# Patient Record
Sex: FEMALE | Race: WHITE | NOT HISPANIC OR LATINO | URBAN - METROPOLITAN AREA
[De-identification: names, ages, dates, MRNs, and addresses within clinical notes are randomized per-mention and may not be internally consistent; named-entity substitution may affect disease eponyms.]

---

## 2017-02-03 ENCOUNTER — OUTPATIENT (OUTPATIENT)
Dept: OUTPATIENT SERVICES | Facility: HOSPITAL | Age: 62
LOS: 1 days | End: 2017-02-03
Payer: COMMERCIAL

## 2017-02-03 ENCOUNTER — TRANSCRIPTION ENCOUNTER (OUTPATIENT)
Age: 62
End: 2017-02-03

## 2017-02-03 VITALS
DIASTOLIC BLOOD PRESSURE: 75 MMHG | HEART RATE: 57 BPM | WEIGHT: 222.67 LBS | OXYGEN SATURATION: 98 % | TEMPERATURE: 98 F | RESPIRATION RATE: 11 BRPM | SYSTOLIC BLOOD PRESSURE: 149 MMHG | HEIGHT: 63 IN

## 2017-02-03 VITALS
SYSTOLIC BLOOD PRESSURE: 112 MMHG | HEART RATE: 74 BPM | OXYGEN SATURATION: 94 % | DIASTOLIC BLOOD PRESSURE: 72 MMHG | RESPIRATION RATE: 17 BRPM

## 2017-02-03 DIAGNOSIS — H33.012 RETINAL DETACHMENT WITH SINGLE BREAK, LEFT EYE: ICD-10-CM

## 2017-02-03 PROCEDURE — 85610 PROTHROMBIN TIME: CPT

## 2017-02-03 PROCEDURE — 85730 THROMBOPLASTIN TIME PARTIAL: CPT

## 2017-02-03 PROCEDURE — C1889: CPT

## 2017-02-03 PROCEDURE — C1814: CPT

## 2017-02-03 PROCEDURE — 93010 ELECTROCARDIOGRAM REPORT: CPT

## 2017-02-03 PROCEDURE — 93005 ELECTROCARDIOGRAM TRACING: CPT

## 2017-02-03 PROCEDURE — 67108 REPAIR DETACHED RETINA: CPT | Mod: LT

## 2017-02-03 NOTE — ASU PATIENT PROFILE, ADULT - PMH
Afib    Diabetes  Pre diabetic"diet Controlled"  HTN (hypertension)    Umbilical hernia, incarcerated

## 2017-02-03 NOTE — ASU DISCHARGE PLAN (ADULT/PEDIATRIC). - PT EDUC
Gas bubble instructions reviewed with good understanding ,gas bubble bracelet on pt.  Eye shield with instructions , sunglasses and eye kit given to patient./other (specify)

## 2017-02-03 NOTE — ASU DISCHARGE PLAN (ADULT/PEDIATRIC). - NOTIFY
Inability to Tolerate Liquids or Foods/Persistent Nausea and Vomiting/Pain not relieved by Medications/Unable to Urinate/Swelling that continues/Fever greater than 101/Bleeding that does not stop

## 2017-06-20 ENCOUNTER — RESULT REVIEW (OUTPATIENT)
Age: 62
End: 2017-06-20

## 2020-08-26 PROBLEM — I10 ESSENTIAL (PRIMARY) HYPERTENSION: Chronic | Status: ACTIVE | Noted: 2017-02-03

## 2020-09-03 ENCOUNTER — OUTPATIENT (OUTPATIENT)
Dept: OUTPATIENT SERVICES | Facility: HOSPITAL | Age: 65
LOS: 1 days | End: 2020-09-03
Payer: COMMERCIAL

## 2020-09-03 VITALS
WEIGHT: 209.44 LBS | TEMPERATURE: 98 F | SYSTOLIC BLOOD PRESSURE: 136 MMHG | HEIGHT: 62 IN | OXYGEN SATURATION: 98 % | DIASTOLIC BLOOD PRESSURE: 80 MMHG | HEART RATE: 74 BPM | RESPIRATION RATE: 16 BRPM

## 2020-09-03 DIAGNOSIS — Z41.9 ENCOUNTER FOR PROCEDURE FOR PURPOSES OTHER THAN REMEDYING HEALTH STATE, UNSPECIFIED: Chronic | ICD-10-CM

## 2020-09-03 DIAGNOSIS — N95.0 POSTMENOPAUSAL BLEEDING: ICD-10-CM

## 2020-09-03 DIAGNOSIS — Z98.890 OTHER SPECIFIED POSTPROCEDURAL STATES: Chronic | ICD-10-CM

## 2020-09-03 DIAGNOSIS — Z01.818 ENCOUNTER FOR OTHER PREPROCEDURAL EXAMINATION: ICD-10-CM

## 2020-09-03 LAB
A1C WITH ESTIMATED AVERAGE GLUCOSE RESULT: 14.5 % — HIGH (ref 4–5.6)
ANION GAP SERPL CALC-SCNC: 9 MMOL/L — SIGNIFICANT CHANGE UP (ref 5–17)
BASOPHILS # BLD AUTO: 0.05 K/UL — SIGNIFICANT CHANGE UP (ref 0–0.2)
BASOPHILS NFR BLD AUTO: 0.6 % — SIGNIFICANT CHANGE UP (ref 0–2)
BUN SERPL-MCNC: 25 MG/DL — HIGH (ref 7–23)
CALCIUM SERPL-MCNC: 9.3 MG/DL — SIGNIFICANT CHANGE UP (ref 8.5–10.1)
CHLORIDE SERPL-SCNC: 104 MMOL/L — SIGNIFICANT CHANGE UP (ref 96–108)
CO2 SERPL-SCNC: 22 MMOL/L — SIGNIFICANT CHANGE UP (ref 22–31)
CREAT SERPL-MCNC: 1.27 MG/DL — SIGNIFICANT CHANGE UP (ref 0.5–1.3)
EOSINOPHIL # BLD AUTO: 0.18 K/UL — SIGNIFICANT CHANGE UP (ref 0–0.5)
EOSINOPHIL NFR BLD AUTO: 2 % — SIGNIFICANT CHANGE UP (ref 0–6)
ESTIMATED AVERAGE GLUCOSE: 369 MG/DL — HIGH (ref 68–114)
GLUCOSE SERPL-MCNC: 359 MG/DL — HIGH (ref 70–99)
HCT VFR BLD CALC: 38.9 % — SIGNIFICANT CHANGE UP (ref 34.5–45)
HGB BLD-MCNC: 11.8 G/DL — SIGNIFICANT CHANGE UP (ref 11.5–15.5)
IMM GRANULOCYTES NFR BLD AUTO: 0.7 % — SIGNIFICANT CHANGE UP (ref 0–1.5)
LYMPHOCYTES # BLD AUTO: 1.53 K/UL — SIGNIFICANT CHANGE UP (ref 1–3.3)
LYMPHOCYTES # BLD AUTO: 17 % — SIGNIFICANT CHANGE UP (ref 13–44)
MCHC RBC-ENTMCNC: 24.7 PG — LOW (ref 27–34)
MCHC RBC-ENTMCNC: 30.3 GM/DL — LOW (ref 32–36)
MCV RBC AUTO: 81.4 FL — SIGNIFICANT CHANGE UP (ref 80–100)
MONOCYTES # BLD AUTO: 0.66 K/UL — SIGNIFICANT CHANGE UP (ref 0–0.9)
MONOCYTES NFR BLD AUTO: 7.3 % — SIGNIFICANT CHANGE UP (ref 2–14)
NEUTROPHILS # BLD AUTO: 6.51 K/UL — SIGNIFICANT CHANGE UP (ref 1.8–7.4)
NEUTROPHILS NFR BLD AUTO: 72.4 % — SIGNIFICANT CHANGE UP (ref 43–77)
PLATELET # BLD AUTO: 292 K/UL — SIGNIFICANT CHANGE UP (ref 150–400)
POTASSIUM SERPL-MCNC: 4.6 MMOL/L — SIGNIFICANT CHANGE UP (ref 3.5–5.3)
POTASSIUM SERPL-SCNC: 4.6 MMOL/L — SIGNIFICANT CHANGE UP (ref 3.5–5.3)
RBC # BLD: 4.78 M/UL — SIGNIFICANT CHANGE UP (ref 3.8–5.2)
RBC # FLD: 19 % — HIGH (ref 10.3–14.5)
SODIUM SERPL-SCNC: 135 MMOL/L — SIGNIFICANT CHANGE UP (ref 135–145)
WBC # BLD: 8.99 K/UL — SIGNIFICANT CHANGE UP (ref 3.8–10.5)
WBC # FLD AUTO: 8.99 K/UL — SIGNIFICANT CHANGE UP (ref 3.8–10.5)

## 2020-09-03 PROCEDURE — 93010 ELECTROCARDIOGRAM REPORT: CPT

## 2020-09-03 PROCEDURE — 80048 BASIC METABOLIC PNL TOTAL CA: CPT

## 2020-09-03 PROCEDURE — 85025 COMPLETE CBC W/AUTO DIFF WBC: CPT

## 2020-09-03 PROCEDURE — 86900 BLOOD TYPING SEROLOGIC ABO: CPT

## 2020-09-03 PROCEDURE — G0463: CPT | Mod: 25

## 2020-09-03 PROCEDURE — 83036 HEMOGLOBIN GLYCOSYLATED A1C: CPT

## 2020-09-03 PROCEDURE — 36415 COLL VENOUS BLD VENIPUNCTURE: CPT

## 2020-09-03 PROCEDURE — 93005 ELECTROCARDIOGRAM TRACING: CPT

## 2020-09-03 PROCEDURE — 86850 RBC ANTIBODY SCREEN: CPT

## 2020-09-03 PROCEDURE — 86901 BLOOD TYPING SEROLOGIC RH(D): CPT

## 2020-09-03 NOTE — H&P PST ADULT - NSICDXFAMILYHX_GEN_ALL_CORE_FT
FAMILY HISTORY:  Family history of early CAD, father who passed away at age 41  Family history of malignant melanoma, mother who passed away at age 54  FH: myocardial infarction, father who passed away at age 41

## 2020-09-03 NOTE — H&P PST ADULT - HISTORY OF PRESENT ILLNESS
This is a 64 y/o female with a PMH of afib S/P ablation X 2 (now on coumadin), HTN, prediabetic (awaiting new Hgb A1C), who reports she has been having post menopausal bleeding that was occurring intermittently since she was aged 57. She reports that in March 2020 the bleeding increased and was diagnosed with fibroids. She is now scheduled for a laparoscopic total hysterectomy and B/L salpingo-oophorectomy and removal of vulva skin tags. She denies any fevers, abdominal pain, N/V/D, or back pain. Denies SOB, chest pain or palpations.

## 2020-09-03 NOTE — H&P PST ADULT - NSICDXPASTSURGICALHX_GEN_ALL_CORE_FT
PAST SURGICAL HISTORY:  Elective surgery Left detached retina 2017    History of umbilical hernia repair 2006

## 2020-09-03 NOTE — H&P PST ADULT - ASSESSMENT
This is a 66 y/o female with post menopausal bleeding and fibroids who is scheduled for a laparoscopic total hysterectomy and B/L salpingo oophorectomy and removal of vulva skin tags    Patient instructed on     1. NPO post midnight of surgery  2. On the use of EZ sponges  3. Aware that she needs medical and cardiac clearance (will ask when to hold Coumadin)   4. Aware that she needs COVID 19 testing on September 7, @ 11:30 AM  5. May take Metoprolol and Multaq with a sip of water on morning of procedure

## 2020-09-03 NOTE — H&P PST ADULT - NSICDXPASTMEDICALHX_GEN_ALL_CORE_FT
PAST MEDICAL HISTORY:  Afib on coumadin    Diabetes Pre diabetic . Awaiting results of HgbA1C as of 9/3/2020    Fibroid, uterine     HTN (hypertension)     Menorrhagia     Post-menopause bleeding     S/P ablation of atrial flutter     UTI (urinary tract infection), bacterial pt on antibiotics as of 9/3/2020

## 2020-09-04 DIAGNOSIS — Z01.818 ENCOUNTER FOR OTHER PREPROCEDURAL EXAMINATION: ICD-10-CM

## 2020-09-04 DIAGNOSIS — N95.0 POSTMENOPAUSAL BLEEDING: ICD-10-CM

## 2020-09-04 NOTE — CHART NOTE - NSCHARTNOTEFT_GEN_A_CORE
Patient seen in PST 9/3/2020. Blood work done. HGB AIC of 14.5% with a glucose of 359 mg/dL. Patient state she was prediabetic. Call made to Dr. Kirk office. Office closed 9/4/2020. Call made to PCP, Dr. Pink. Patient presently in office for medical evaluation. Faxed copy of labwork received by office. Patient to be evaluated.

## 2020-09-25 PROBLEM — I48.91 UNSPECIFIED ATRIAL FIBRILLATION: Chronic | Status: ACTIVE | Noted: 2017-02-03

## 2020-09-25 PROBLEM — N95.0 POSTMENOPAUSAL BLEEDING: Chronic | Status: ACTIVE | Noted: 2020-09-03

## 2020-09-25 PROBLEM — D25.9 LEIOMYOMA OF UTERUS, UNSPECIFIED: Chronic | Status: ACTIVE | Noted: 2020-09-03

## 2020-09-25 PROBLEM — N92.0 EXCESSIVE AND FREQUENT MENSTRUATION WITH REGULAR CYCLE: Chronic | Status: ACTIVE | Noted: 2020-09-03

## 2020-10-15 ENCOUNTER — OUTPATIENT (OUTPATIENT)
Dept: OUTPATIENT SERVICES | Facility: HOSPITAL | Age: 65
LOS: 1 days | End: 2020-10-15
Payer: COMMERCIAL

## 2020-10-15 VITALS
TEMPERATURE: 98 F | HEIGHT: 62.5 IN | OXYGEN SATURATION: 98 % | SYSTOLIC BLOOD PRESSURE: 144 MMHG | RESPIRATION RATE: 16 BRPM | HEART RATE: 80 BPM | WEIGHT: 205.47 LBS | DIASTOLIC BLOOD PRESSURE: 66 MMHG

## 2020-10-15 DIAGNOSIS — Z41.9 ENCOUNTER FOR PROCEDURE FOR PURPOSES OTHER THAN REMEDYING HEALTH STATE, UNSPECIFIED: Chronic | ICD-10-CM

## 2020-10-15 DIAGNOSIS — Z98.890 OTHER SPECIFIED POSTPROCEDURAL STATES: Chronic | ICD-10-CM

## 2020-10-15 DIAGNOSIS — Z01.818 ENCOUNTER FOR OTHER PREPROCEDURAL EXAMINATION: ICD-10-CM

## 2020-10-15 DIAGNOSIS — N95.0 POSTMENOPAUSAL BLEEDING: ICD-10-CM

## 2020-10-15 LAB
A1C WITH ESTIMATED AVERAGE GLUCOSE RESULT: 10.6 % — HIGH (ref 4–5.6)
ANION GAP SERPL CALC-SCNC: 8 MMOL/L — SIGNIFICANT CHANGE UP (ref 5–17)
BASOPHILS # BLD AUTO: 0.06 K/UL — SIGNIFICANT CHANGE UP (ref 0–0.2)
BASOPHILS NFR BLD AUTO: 0.6 % — SIGNIFICANT CHANGE UP (ref 0–2)
BUN SERPL-MCNC: 30 MG/DL — HIGH (ref 7–23)
CALCIUM SERPL-MCNC: 9.1 MG/DL — SIGNIFICANT CHANGE UP (ref 8.5–10.1)
CHLORIDE SERPL-SCNC: 109 MMOL/L — HIGH (ref 96–108)
CO2 SERPL-SCNC: 23 MMOL/L — SIGNIFICANT CHANGE UP (ref 22–31)
CREAT SERPL-MCNC: 1.08 MG/DL — SIGNIFICANT CHANGE UP (ref 0.5–1.3)
EOSINOPHIL # BLD AUTO: 0.21 K/UL — SIGNIFICANT CHANGE UP (ref 0–0.5)
EOSINOPHIL NFR BLD AUTO: 2.1 % — SIGNIFICANT CHANGE UP (ref 0–6)
ESTIMATED AVERAGE GLUCOSE: 258 MG/DL — HIGH (ref 68–114)
GLUCOSE SERPL-MCNC: 178 MG/DL — HIGH (ref 70–99)
HCT VFR BLD CALC: 37.8 % — SIGNIFICANT CHANGE UP (ref 34.5–45)
HGB BLD-MCNC: 11.4 G/DL — LOW (ref 11.5–15.5)
IMM GRANULOCYTES NFR BLD AUTO: 0.9 % — SIGNIFICANT CHANGE UP (ref 0–1.5)
LYMPHOCYTES # BLD AUTO: 1.84 K/UL — SIGNIFICANT CHANGE UP (ref 1–3.3)
LYMPHOCYTES # BLD AUTO: 18.1 % — SIGNIFICANT CHANGE UP (ref 13–44)
MCHC RBC-ENTMCNC: 24.7 PG — LOW (ref 27–34)
MCHC RBC-ENTMCNC: 30.2 GM/DL — LOW (ref 32–36)
MCV RBC AUTO: 82 FL — SIGNIFICANT CHANGE UP (ref 80–100)
MONOCYTES # BLD AUTO: 0.74 K/UL — SIGNIFICANT CHANGE UP (ref 0–0.9)
MONOCYTES NFR BLD AUTO: 7.3 % — SIGNIFICANT CHANGE UP (ref 2–14)
NEUTROPHILS # BLD AUTO: 7.22 K/UL — SIGNIFICANT CHANGE UP (ref 1.8–7.4)
NEUTROPHILS NFR BLD AUTO: 71 % — SIGNIFICANT CHANGE UP (ref 43–77)
PLATELET # BLD AUTO: 352 K/UL — SIGNIFICANT CHANGE UP (ref 150–400)
POTASSIUM SERPL-MCNC: 4.1 MMOL/L — SIGNIFICANT CHANGE UP (ref 3.5–5.3)
POTASSIUM SERPL-SCNC: 4.1 MMOL/L — SIGNIFICANT CHANGE UP (ref 3.5–5.3)
RBC # BLD: 4.61 M/UL — SIGNIFICANT CHANGE UP (ref 3.8–5.2)
RBC # FLD: 16.2 % — HIGH (ref 10.3–14.5)
SODIUM SERPL-SCNC: 140 MMOL/L — SIGNIFICANT CHANGE UP (ref 135–145)
WBC # BLD: 10.16 K/UL — SIGNIFICANT CHANGE UP (ref 3.8–10.5)
WBC # FLD AUTO: 10.16 K/UL — SIGNIFICANT CHANGE UP (ref 3.8–10.5)

## 2020-10-15 PROCEDURE — 86850 RBC ANTIBODY SCREEN: CPT

## 2020-10-15 PROCEDURE — 36415 COLL VENOUS BLD VENIPUNCTURE: CPT

## 2020-10-15 PROCEDURE — 86901 BLOOD TYPING SEROLOGIC RH(D): CPT

## 2020-10-15 PROCEDURE — 86900 BLOOD TYPING SEROLOGIC ABO: CPT

## 2020-10-15 PROCEDURE — 85025 COMPLETE CBC W/AUTO DIFF WBC: CPT

## 2020-10-15 PROCEDURE — 80048 BASIC METABOLIC PNL TOTAL CA: CPT

## 2020-10-15 PROCEDURE — G0463: CPT | Mod: 25

## 2020-10-15 PROCEDURE — 83036 HEMOGLOBIN GLYCOSYLATED A1C: CPT

## 2020-10-15 RX ORDER — NYSTATIN CREAM 100000 [USP'U]/G
1 CREAM TOPICAL
Qty: 0 | Refills: 0 | DISCHARGE

## 2020-10-15 RX ORDER — CEFUROXIME AXETIL 250 MG
1 TABLET ORAL
Qty: 0 | Refills: 0 | DISCHARGE

## 2020-10-15 RX ORDER — L.ACIDOPH/B.ANIMALIS/B.LONGUM 15B CELL
1 CAPSULE ORAL
Qty: 0 | Refills: 0 | DISCHARGE

## 2020-10-15 NOTE — H&P PST ADULT - NSICDXPASTSURGICALHX_GEN_ALL_CORE_FT
PAST SURGICAL HISTORY:  Elective surgery Left detached retina 2017    History of umbilical hernia repair 2006    S/P ablation of atrial fibrillation 2/2015 and 9/2015

## 2020-10-15 NOTE — H&P PST ADULT - ASSESSMENT
This is a 66 y/o female with post menopausal bleeding and fibroids who is scheduled for a laparoscopic total hysterectomy and B/L salpingo oophorectomy and removal of vulva skin tags    Patient instructed on     1. NPO post midnight of surgery  2. On the use of EZ sponges  3. Aware that she needs medical (Dr. Pink) and cardiac clearance (Dr. Eligio Yanez (was instructed to hold Coumadin 2 to 3 days prior to procedure)   4. Aware that she needs COVID 19 testing on October 19 @ 8 AM  5. May take Metoprolol and Multaq with a sip of water on morning of procedure   6. Will ask if she should hold or decrease evening dose of insulin the evening prior to procedure. Pt will call her endocrinologist (Dr. Joon Sahu)

## 2020-10-15 NOTE — H&P PST ADULT - NSICDXPASTMEDICALHX_GEN_ALL_CORE_FT
PAST MEDICAL HISTORY:  Afib on coumadin    Diabetes dx 2020    Fibroid, uterine     HTN (hypertension)     Menorrhagia     Post-menopause bleeding     S/P ablation of atrial fibrillation

## 2020-10-16 DIAGNOSIS — Z01.818 ENCOUNTER FOR OTHER PREPROCEDURAL EXAMINATION: ICD-10-CM

## 2020-10-16 DIAGNOSIS — N95.0 POSTMENOPAUSAL BLEEDING: ICD-10-CM

## 2020-10-19 ENCOUNTER — OUTPATIENT (OUTPATIENT)
Dept: OUTPATIENT SERVICES | Facility: HOSPITAL | Age: 65
LOS: 1 days | End: 2020-10-19
Payer: COMMERCIAL

## 2020-10-19 DIAGNOSIS — Z41.9 ENCOUNTER FOR PROCEDURE FOR PURPOSES OTHER THAN REMEDYING HEALTH STATE, UNSPECIFIED: Chronic | ICD-10-CM

## 2020-10-19 DIAGNOSIS — Z11.59 ENCOUNTER FOR SCREENING FOR OTHER VIRAL DISEASES: ICD-10-CM

## 2020-10-19 DIAGNOSIS — Z98.890 OTHER SPECIFIED POSTPROCEDURAL STATES: Chronic | ICD-10-CM

## 2020-10-19 LAB — SARS-COV-2 RNA SPEC QL NAA+PROBE: SIGNIFICANT CHANGE UP

## 2020-10-19 PROCEDURE — U0003: CPT

## 2020-10-20 DIAGNOSIS — Z11.59 ENCOUNTER FOR SCREENING FOR OTHER VIRAL DISEASES: ICD-10-CM

## 2020-10-20 NOTE — CHART NOTE - NSCHARTNOTEFT_GEN_A_CORE
Abnormal labs, elevated HgBA1C noted on chart review. All lab reports, EKG faxed to surgeon and Dr. Pink. Message left at surgeon's office answering machine regarding elevated HgBA1C.

## 2020-10-21 RX ORDER — FENTANYL CITRATE 50 UG/ML
50 INJECTION INTRAVENOUS
Refills: 0 | Status: DISCONTINUED | OUTPATIENT
Start: 2020-10-22 | End: 2020-10-22

## 2020-10-21 RX ORDER — SODIUM CHLORIDE 9 MG/ML
1000 INJECTION, SOLUTION INTRAVENOUS
Refills: 0 | Status: DISCONTINUED | OUTPATIENT
Start: 2020-10-22 | End: 2020-10-22

## 2020-10-21 RX ORDER — OXYCODONE HYDROCHLORIDE 5 MG/1
5 TABLET ORAL ONCE
Refills: 0 | Status: DISCONTINUED | OUTPATIENT
Start: 2020-10-22 | End: 2020-10-22

## 2020-10-21 RX ORDER — HYDROMORPHONE HYDROCHLORIDE 2 MG/ML
0.5 INJECTION INTRAMUSCULAR; INTRAVENOUS; SUBCUTANEOUS
Refills: 0 | Status: DISCONTINUED | OUTPATIENT
Start: 2020-10-22 | End: 2020-10-22

## 2020-10-22 ENCOUNTER — RESULT REVIEW (OUTPATIENT)
Age: 65
End: 2020-10-22

## 2020-10-22 ENCOUNTER — OUTPATIENT (OUTPATIENT)
Dept: INPATIENT UNIT | Facility: HOSPITAL | Age: 65
LOS: 1 days | Discharge: HOME | End: 2020-10-22
Payer: COMMERCIAL

## 2020-10-22 VITALS
HEART RATE: 72 BPM | WEIGHT: 205.91 LBS | HEIGHT: 62.5 IN | OXYGEN SATURATION: 99 % | SYSTOLIC BLOOD PRESSURE: 152 MMHG | DIASTOLIC BLOOD PRESSURE: 91 MMHG | TEMPERATURE: 98 F | RESPIRATION RATE: 16 BRPM

## 2020-10-22 DIAGNOSIS — N95.0 POSTMENOPAUSAL BLEEDING: ICD-10-CM

## 2020-10-22 DIAGNOSIS — Z41.9 ENCOUNTER FOR PROCEDURE FOR PURPOSES OTHER THAN REMEDYING HEALTH STATE, UNSPECIFIED: Chronic | ICD-10-CM

## 2020-10-22 DIAGNOSIS — Z98.890 OTHER SPECIFIED POSTPROCEDURAL STATES: Chronic | ICD-10-CM

## 2020-10-22 DIAGNOSIS — D25.9 LEIOMYOMA OF UTERUS, UNSPECIFIED: ICD-10-CM

## 2020-10-22 LAB
APTT BLD: 29.8 SEC — SIGNIFICANT CHANGE UP (ref 27.5–35.5)
APTT BLD: 35.9 SEC — HIGH (ref 27.5–35.5)
HCT VFR BLD CALC: 34.5 % — SIGNIFICANT CHANGE UP (ref 34.5–45)
HGB BLD-MCNC: 10.6 G/DL — LOW (ref 11.5–15.5)
INR BLD: 2.13 RATIO — HIGH (ref 0.88–1.16)
INR BLD: 2.38 RATIO — HIGH (ref 0.88–1.16)
PROTHROM AB SERPL-ACNC: 24 SEC — HIGH (ref 10.6–13.6)
PROTHROM AB SERPL-ACNC: 26.7 SEC — HIGH (ref 10.6–13.6)

## 2020-10-22 PROCEDURE — 85730 THROMBOPLASTIN TIME PARTIAL: CPT

## 2020-10-22 PROCEDURE — S2900: CPT

## 2020-10-22 PROCEDURE — 88307 TISSUE EXAM BY PATHOLOGIST: CPT

## 2020-10-22 PROCEDURE — 88305 TISSUE EXAM BY PATHOLOGIST: CPT

## 2020-10-22 PROCEDURE — 36415 COLL VENOUS BLD VENIPUNCTURE: CPT

## 2020-10-22 PROCEDURE — 85610 PROTHROMBIN TIME: CPT

## 2020-10-22 PROCEDURE — 88307 TISSUE EXAM BY PATHOLOGIST: CPT | Mod: 26

## 2020-10-22 PROCEDURE — 82962 GLUCOSE BLOOD TEST: CPT

## 2020-10-22 PROCEDURE — 57425 LAPAROSCOPY SURG COLPOPEXY: CPT | Mod: AS

## 2020-10-22 PROCEDURE — 85014 HEMATOCRIT: CPT

## 2020-10-22 PROCEDURE — 88305 TISSUE EXAM BY PATHOLOGIST: CPT | Mod: 26

## 2020-10-22 PROCEDURE — 85018 HEMOGLOBIN: CPT

## 2020-10-22 PROCEDURE — C1889: CPT

## 2020-10-22 PROCEDURE — 58573 TLH W/T/O UTERUS OVER 250 G: CPT | Mod: AS

## 2020-10-22 RX ORDER — HYDROMORPHONE HYDROCHLORIDE 2 MG/ML
1 INJECTION INTRAMUSCULAR; INTRAVENOUS; SUBCUTANEOUS EVERY 4 HOURS
Refills: 0 | Status: DISCONTINUED | OUTPATIENT
Start: 2020-10-22 | End: 2020-10-23

## 2020-10-22 RX ORDER — METOPROLOL TARTRATE 50 MG
1 TABLET ORAL
Qty: 0 | Refills: 0 | DISCHARGE

## 2020-10-22 RX ORDER — IBUPROFEN 200 MG
600 TABLET ORAL EVERY 6 HOURS
Refills: 0 | Status: DISCONTINUED | OUTPATIENT
Start: 2020-10-22 | End: 2020-10-23

## 2020-10-22 RX ORDER — DRONEDARONE 400 MG/1
1 TABLET, FILM COATED ORAL
Qty: 0 | Refills: 0 | DISCHARGE

## 2020-10-22 RX ORDER — INSULIN GLARGINE 100 [IU]/ML
20 INJECTION, SOLUTION SUBCUTANEOUS
Qty: 0 | Refills: 0 | DISCHARGE

## 2020-10-22 RX ORDER — ONDANSETRON 8 MG/1
4 TABLET, FILM COATED ORAL EVERY 6 HOURS
Refills: 0 | Status: DISCONTINUED | OUTPATIENT
Start: 2020-10-22 | End: 2020-10-23

## 2020-10-22 RX ORDER — OXYCODONE AND ACETAMINOPHEN 5; 325 MG/1; MG/1
1 TABLET ORAL EVERY 4 HOURS
Refills: 0 | Status: DISCONTINUED | OUTPATIENT
Start: 2020-10-22 | End: 2020-10-23

## 2020-10-22 RX ORDER — INSULIN LISPRO 100/ML
3 VIAL (ML) SUBCUTANEOUS ONCE
Refills: 0 | Status: COMPLETED | OUTPATIENT
Start: 2020-10-22 | End: 2020-10-22

## 2020-10-22 RX ORDER — SODIUM CHLORIDE 9 MG/ML
1000 INJECTION, SOLUTION INTRAVENOUS
Refills: 0 | Status: DISCONTINUED | OUTPATIENT
Start: 2020-10-22 | End: 2020-10-23

## 2020-10-22 RX ORDER — METOCLOPRAMIDE HCL 10 MG
10 TABLET ORAL EVERY 6 HOURS
Refills: 0 | Status: DISCONTINUED | OUTPATIENT
Start: 2020-10-22 | End: 2020-10-23

## 2020-10-22 RX ORDER — NORETHINDRONE 0.35 MG/1
1 TABLET ORAL
Qty: 0 | Refills: 0 | DISCHARGE

## 2020-10-22 RX ORDER — METOCLOPRAMIDE HCL 10 MG
10 TABLET ORAL EVERY 6 HOURS
Refills: 0 | Status: DISCONTINUED | OUTPATIENT
Start: 2020-10-22 | End: 2020-10-22

## 2020-10-22 RX ORDER — OXYCODONE AND ACETAMINOPHEN 5; 325 MG/1; MG/1
2 TABLET ORAL EVERY 4 HOURS
Refills: 0 | Status: DISCONTINUED | OUTPATIENT
Start: 2020-10-22 | End: 2020-10-23

## 2020-10-22 RX ORDER — ERGOCALCIFEROL 1.25 MG/1
1 CAPSULE ORAL
Qty: 0 | Refills: 0 | DISCHARGE

## 2020-10-22 RX ORDER — WARFARIN SODIUM 2.5 MG/1
1 TABLET ORAL
Qty: 0 | Refills: 0 | DISCHARGE

## 2020-10-22 RX ADMIN — Medication 10 MILLIGRAM(S): at 20:57

## 2020-10-22 RX ADMIN — SODIUM CHLORIDE 75 MILLILITER(S): 9 INJECTION, SOLUTION INTRAVENOUS at 22:30

## 2020-10-22 RX ADMIN — Medication 600 MILLIGRAM(S): at 23:04

## 2020-10-22 RX ADMIN — ONDANSETRON 4 MILLIGRAM(S): 8 TABLET, FILM COATED ORAL at 18:51

## 2020-10-22 RX ADMIN — Medication 600 MILLIGRAM(S): at 23:38

## 2020-10-22 RX ADMIN — Medication 3 UNIT(S): at 12:17

## 2020-10-22 RX ADMIN — FENTANYL CITRATE 50 MICROGRAM(S): 50 INJECTION INTRAVENOUS at 13:50

## 2020-10-22 RX ADMIN — HYDROMORPHONE HYDROCHLORIDE 0.5 MILLIGRAM(S): 2 INJECTION INTRAMUSCULAR; INTRAVENOUS; SUBCUTANEOUS at 18:46

## 2020-10-22 RX ADMIN — HYDROMORPHONE HYDROCHLORIDE 0.5 MILLIGRAM(S): 2 INJECTION INTRAMUSCULAR; INTRAVENOUS; SUBCUTANEOUS at 17:51

## 2020-10-22 RX ADMIN — OXYCODONE HYDROCHLORIDE 5 MILLIGRAM(S): 5 TABLET ORAL at 15:08

## 2020-10-22 NOTE — ASU PATIENT PROFILE, ADULT - PMH
Afib  on coumadin  Diabetes  dx 2020  Fibroid, uterine    HTN (hypertension)    Menorrhagia    Post-menopause bleeding    S/P ablation of atrial fibrillation

## 2020-10-22 NOTE — ASU PATIENT PROFILE, ADULT - PSH
Elective surgery  Left detached retina 2017  History of umbilical hernia repair  2006  S/P ablation of atrial fibrillation  2/2015 and 9/2015

## 2020-10-22 NOTE — BRIEF OPERATIVE NOTE - SPECIMENS
uterus and adnexa uterus ( grams); right adnexa; left adnexa uterus ( 637 grams); right adnexa; left adnexa

## 2020-10-22 NOTE — BRIEF OPERATIVE NOTE - NSICDXBRIEFPOSTOP_GEN_ALL_CORE_FT
POST-OP DIAGNOSIS:  Fibroid uterus 22-Oct-2020 07:38:16  Aakash Cagle  Post-menopause bleeding 22-Oct-2020 07:38:09  Aakash Cagle

## 2020-10-22 NOTE — BRIEF OPERATIVE NOTE - NSICDXBRIEFPROCEDURE_GEN_ALL_CORE_FT
PROCEDURES:  Bilateral salpingo-oophorectomy 22-Oct-2020 07:37:31 Aakash Heard  Total vaginal hysterectomy with cystoscopy 22-Oct-2020 07:37:10 Aakash Heard  Robot-assisted single incision pelviscopy 22-Oct-2020 07:36:58  Aakash Cagle  Exam under anesthesia, gynecologic 22-Oct-2020 07:36:15  Aakash Cagle   PROCEDURES:  Cystoscopy 22-Oct-2020 10:59:43  Aakash Cagle  Total abdominal hysterectomy and bilateral salpingo-oophorectomy 22-Oct-2020 10:58:40 RASS Aakash Cagle  Enterolysis 22-Oct-2020 10:57:22 LESS Aakash Cagle  Excision, skin tag 22-Oct-2020 10:57:09  Aakash Cagle  Robot-assisted single incision pelviscopy 22-Oct-2020 07:36:58  Aakash Cagle  Exam under anesthesia, gynecologic 22-Oct-2020 07:36:15  Aakash Cagle

## 2020-10-22 NOTE — BRIEF OPERATIVE NOTE - OPERATION/FINDINGS
12 week fibroid uterus; normal adnexa; anterior cul de sac adhesions; normal abdomen; normal bladder 14 week fibroid uterus; normal adnexa; anterior abdominal adhesions; normal abdomen; normal bladder

## 2020-10-22 NOTE — ASU DISCHARGE PLAN (ADULT/PEDIATRIC) - CALL YOUR DOCTOR IF YOU HAVE ANY OF THE FOLLOWING:
Inability to tolerate liquids or foods/heavy vaginal bleeding/Pain not relieved by Medications/Fever greater than (need to indicate Fahrenheit or Celsius)/Unable to urinate

## 2020-10-22 NOTE — ASU DISCHARGE PLAN (ADULT/PEDIATRIC) - CARE PROVIDER_API CALL
Aakash Cagle  OBSTETRICS AND GYNECOLOGY  [address] [place] [address]  Phone: [phone]  Fax: [phone]  Follow Up Time: 2 weeks

## 2020-10-22 NOTE — BRIEF OPERATIVE NOTE - NSICDXBRIEFPREOP_GEN_ALL_CORE_FT
PRE-OP DIAGNOSIS:  Fibroid uterus 22-Oct-2020 07:38:00  Aakash Cagle  Post-menopause bleeding 22-Oct-2020 07:37:50  Aakash Cagle

## 2020-10-23 ENCOUNTER — TRANSCRIPTION ENCOUNTER (OUTPATIENT)
Age: 65
End: 2020-10-23

## 2020-10-23 VITALS
TEMPERATURE: 98 F | HEART RATE: 92 BPM | RESPIRATION RATE: 18 BRPM | SYSTOLIC BLOOD PRESSURE: 150 MMHG | DIASTOLIC BLOOD PRESSURE: 71 MMHG | OXYGEN SATURATION: 97 %

## 2020-10-23 RX ADMIN — Medication 600 MILLIGRAM(S): at 07:02

## 2020-10-23 RX ADMIN — OXYCODONE AND ACETAMINOPHEN 1 TABLET(S): 5; 325 TABLET ORAL at 05:30

## 2020-10-23 RX ADMIN — Medication 600 MILLIGRAM(S): at 06:49

## 2020-10-23 RX ADMIN — OXYCODONE AND ACETAMINOPHEN 1 TABLET(S): 5; 325 TABLET ORAL at 04:54

## 2020-10-23 NOTE — DISCHARGE NOTE NURSING/CASE MANAGEMENT/SOCIAL WORK - PATIENT PORTAL LINK FT
You can access the FollowMyHealth Patient Portal offered by [facility] by registering at the following website: [email or web address]. By joining [facility]’s FollowMyHealth portal, you will also be able to view your health information using other applications (apps) compatible with our system.

## 2020-10-23 NOTE — PROGRESS NOTE ADULT - SUBJECTIVE AND OBJECTIVE BOX
Postoperative day: 1  surgery: hyst  patient resting comfortably  complaints: none  OR findings and course d/w patient in detail -- all questions answered  nursing input solicited  Focused ROS: negative    Physical exam:    Vital Signs Last 24 Hrs  T(C): 36.6 (23 Oct 2020 00:16), Max: 36.8 (22 Oct 2020 12:05)  T(F): 97.9 (23 Oct 2020 00:16), Max: 98.2 (22 Oct 2020 12:05)  HR: 67 (23 Oct 2020 00:16) (66 - 75)  BP: 116/50 (23 Oct 2020 00:16) (116/50 - 152/91)  BP(mean): --  RR: 18 (23 Oct 2020 00:16) (14 - 19)  SpO2: 97% (23 Oct 2020 00:16) (94% - 100%)      Abdomen: Soft,  appropriately tender, non-distended  Incision is clean dry and intact  Vagina: minimal bleeding  Ext: lower extremities symmetric and without calf tenderness    LABS:                        10.6   x     )-----------( x        ( 22 Oct 2020 17:32 )             34.5             Allergies    Acrylic nails and dental temporary crowns (Swelling)  sotalol (Other)  thimerosal topical (Swelling)    Intolerances          Assessment and Plan  Doing well.  Tolerating regular diet.  Routine post op care.  Plan discharge home  --- routine restrictions  patient given written and verbal discharge instructions

## 2020-10-29 DIAGNOSIS — Z88.8 ALLERGY STATUS TO OTHER DRUGS, MEDICAMENTS AND BIOLOGICAL SUBSTANCES: ICD-10-CM

## 2020-10-29 DIAGNOSIS — Z79.4 LONG TERM (CURRENT) USE OF INSULIN: ICD-10-CM

## 2020-10-29 DIAGNOSIS — N73.6 FEMALE PELVIC PERITONEAL ADHESIONS (POSTINFECTIVE): ICD-10-CM

## 2020-10-29 DIAGNOSIS — N83.202 UNSPECIFIED OVARIAN CYST, LEFT SIDE: ICD-10-CM

## 2020-10-29 DIAGNOSIS — I48.20 CHRONIC ATRIAL FIBRILLATION, UNSPECIFIED: ICD-10-CM

## 2020-10-29 DIAGNOSIS — E11.9 TYPE 2 DIABETES MELLITUS WITHOUT COMPLICATIONS: ICD-10-CM

## 2020-10-29 DIAGNOSIS — N83.201 UNSPECIFIED OVARIAN CYST, RIGHT SIDE: ICD-10-CM

## 2020-10-29 DIAGNOSIS — Z91.041 RADIOGRAPHIC DYE ALLERGY STATUS: ICD-10-CM

## 2020-10-29 DIAGNOSIS — N72 INFLAMMATORY DISEASE OF CERVIX UTERI: ICD-10-CM

## 2020-10-29 DIAGNOSIS — I10 ESSENTIAL (PRIMARY) HYPERTENSION: ICD-10-CM

## 2020-10-29 DIAGNOSIS — D25.9 LEIOMYOMA OF UTERUS, UNSPECIFIED: ICD-10-CM

## 2020-10-29 DIAGNOSIS — N80.0 ENDOMETRIOSIS OF UTERUS: ICD-10-CM

## 2020-10-29 DIAGNOSIS — Z79.01 LONG TERM (CURRENT) USE OF ANTICOAGULANTS: ICD-10-CM

## 2020-10-29 DIAGNOSIS — N87.9 DYSPLASIA OF CERVIX UTERI, UNSPECIFIED: ICD-10-CM

## 2020-10-29 DIAGNOSIS — Z88.7 ALLERGY STATUS TO SERUM AND VACCINE: ICD-10-CM

## 2022-01-01 NOTE — H&P PST ADULT - HISTORY OF PRESENT ILLNESS
This is a 66 y/o female with a PMH of afib S/P ablation X 2 (now on coumadin), HTN, DM2 who reports she has been having post menopausal bleeding that was occurring intermittently since she was aged 57. She reports that in March 2020 the bleeding increased and was diagnosed with fibroids. She is now scheduled for a laparoscopic total hysterectomy and B/L salpingo-oophorectomy and removal of vulva skin tags. She denies any fevers, abdominal pain, N/V/D, or back pain. Denies SOB, chest pain or palpations. Pt was originally scheduled for surgery in September but it was cancelled due to an elevated HgbA1C. Pt is now on insulin.   
Statement Selected

## 2022-05-05 NOTE — ASU PATIENT PROFILE, ADULT - TOBACCO USE
BMI above normal limits, recommended weight loss, improve diet and follow up with internist. Detail Level: Simple Initiate Treatment: Triamcinolone cream to the itchy areas twice daily as needed. Never smoker

## 2023-01-24 ENCOUNTER — OFFICE (OUTPATIENT)
Dept: URBAN - METROPOLITAN AREA CLINIC 12 | Facility: CLINIC | Age: 68
Setting detail: OPHTHALMOLOGY
End: 2023-01-24
Payer: MEDICARE

## 2023-01-24 DIAGNOSIS — H43.391: ICD-10-CM

## 2023-01-24 DIAGNOSIS — E11.9: ICD-10-CM

## 2023-01-24 DIAGNOSIS — H26.493: ICD-10-CM

## 2023-01-24 DIAGNOSIS — H33.312: ICD-10-CM

## 2023-01-24 DIAGNOSIS — H40.013: ICD-10-CM

## 2023-01-24 DIAGNOSIS — H43.812: ICD-10-CM

## 2023-01-24 DIAGNOSIS — H31.002: ICD-10-CM

## 2023-01-24 PROCEDURE — 92014 COMPRE OPH EXAM EST PT 1/>: CPT | Performed by: OPHTHALMOLOGY

## 2023-01-24 PROCEDURE — 92133 CPTRZD OPH DX IMG PST SGM ON: CPT | Performed by: OPHTHALMOLOGY

## 2023-01-24 PROCEDURE — 92083 EXTENDED VISUAL FIELD XM: CPT | Performed by: OPHTHALMOLOGY

## 2023-01-24 ASSESSMENT — VISUAL ACUITY
OD_BCVA: 20/20
OS_BCVA: 20/20

## 2023-01-24 ASSESSMENT — REFRACTION_AUTOREFRACTION
OS_CYLINDER: -0.75
OD_CYLINDER: -0.75
OS_AXIS: 110
OD_AXIS: 098
OD_SPHERE: +0.25
OS_SPHERE: PLANO

## 2023-01-24 ASSESSMENT — TONOMETRY
OD_IOP_MMHG: 17
OS_IOP_MMHG: 15

## 2023-01-24 ASSESSMENT — REFRACTION_CURRENTRX
OD_CYLINDER: -1.25
OS_OVR_VA: 20/
OD_OVR_VA: 20/
OS_SPHERE: PLANO
OS_AXIS: 000
OD_AXIS: 098
OS_CYLINDER: SPHERE
OS_VPRISM_DIRECTION: SV
OD_VPRISM_DIRECTION: SV
OD_SPHERE: -2.00

## 2023-01-24 ASSESSMENT — REFRACTION_MANIFEST
OD_VA1: 20/20-1
OS_CYLINDER: SPHERE
OD_SPHERE: -1.75
OS_SPHERE: -18.50
OD_CYLINDER: -1.25
OS_VA1: 20/500
OS_AXIS: 0
OD_AXIS: 090

## 2023-01-24 ASSESSMENT — KERATOMETRY
OD_K1POWER_DIOPTERS: 44.25
OD_AXISANGLE_DEGREES: 021
OS_K1POWER_DIOPTERS: 44.50
OD_K2POWER_DIOPTERS: 44.75
OS_K2POWER_DIOPTERS: 45.50
OS_AXISANGLE_DEGREES: 041

## 2023-01-24 ASSESSMENT — AXIALLENGTH_DERIVED
OD_AL: 23.2775
OD_AL: 24.1634

## 2023-01-24 ASSESSMENT — CONFRONTATIONAL VISUAL FIELD TEST (CVF)
OD_FINDINGS: FULL
OS_FINDINGS: FULL

## 2023-01-24 ASSESSMENT — SPHEQUIV_DERIVED
OD_SPHEQUIV: -0.125
OD_SPHEQUIV: -2.375

## 2023-01-24 ASSESSMENT — PACHYMETRY
OD_CT_CORRECTION: -2
OS_CT_UM: 566
OD_CT_UM: 571
OS_CT_CORRECTION: -1

## 2023-07-25 ENCOUNTER — OFFICE (OUTPATIENT)
Dept: URBAN - METROPOLITAN AREA CLINIC 12 | Facility: CLINIC | Age: 68
Setting detail: OPHTHALMOLOGY
End: 2023-07-25
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
## 2023-07-25 PROCEDURE — NO SHOW FE NO SHOW FEE: Performed by: OPHTHALMOLOGY

## 2023-10-08 ENCOUNTER — NON-APPOINTMENT (OUTPATIENT)
Age: 68
End: 2023-10-08

## 2023-12-13 NOTE — ASU DISCHARGE PLAN (ADULT/PEDIATRIC) - DRIVING
New IP (Interventional Pulmonology) referral rec'd.  Chart reviewed.         New Patient: Interventional Pulmonary (Lung nodule) Nurse Navigator Note    Referring provider: Diana Ely APRN CNPNb [facility]    Referred to (specialty): Interventional Pulmonary (Lung nodule)    Requested provider (if applicable): n/a    Date Referral Received: 12/13/2023    Evaluation for :  Lung nodule-new increasing nodules lungs on CT scan .. smoker    Clinical History (per Nurse review of records provided):    **BOOK MARKED**    CT CHEST W/O CONTRAST 12/6/2023 8:01 AM     CLINICAL HISTORY: ABNORMAL CHEST XRAY. LUNG NODULE. LONG TERM SMOKER;  Smoker; Chronic cough; Lung nodule; COPD exacerbation (H)     TECHNIQUE: CT chest without IV contrast. Multiplanar reformats were  obtained. Dose reduction techniques were used.  CONTRAST: None.     COMPARISON: Chest radiograph November 16, 2023     FINDINGS:   LUNGS AND PLEURA: Mild probable retained central tracheobronchial  secretions. Scattered areas of pleural-parenchymal scarring in both  lungs. Symmetric biapical pleural/parenchymal scarring. Mild  emphysema.     There are several micronodules in both lungs. For example right upper  lobe apical nodule measures 3 mm series 5-61 and anterior left upper  lobe measuring 3 mm series 5-91. Left upper lobe peribronchial  groundglass nodule measures 9 x 10 mm series 5-162.     MEDIASTINUM/AXILLAE: Heart is normal with trace pericardial effusion.  The thoracic aorta is normal in caliber with severe calcified  atheromatous plaque. Three-vessel coronary artery and mitral annular  calcifications are noted. No axillary or obvious mediastinal or hilar  lymphadenopathy, though evaluation is limited in the absence of  intravenous contrast.     UPPER ABDOMEN: Mild asymmetric right renal atrophy and cortical  scarring. Severe calcified atheromatous plaque of the visualized  abdominal aorta.      MUSCULOSKELETAL: Left chest wall cardiac monitor device. No focal  destructive osseous lesion.                                                                      IMPRESSION:   1.  Left upper lobe groundglass nodule measuring 9 x 10 mm,  technically indeterminate, though suspicious for malignancy such as  adenocarcinoma in situ.  2.  Several additional micronodules in both lungs measuring up to 3  mm.  3.  Emphysema.     LUCIANA WASHINGTON MD       Records Location: [facility]     Records Needed: none    Additional testing needed prior to consult: PFT's     Yes

## 2024-01-08 NOTE — H&P PST ADULT - SYMPTOMS
-Diabetes is improving with A1c 8.7.  -Discussed dietary and exercise guidelines with patient.    -Discussed the importance of yearly eye exams.  -Discussed the importance of checking BG's regularly.    -Continue Ozempic 2 mg weekly.  Patient has no personal history of pancreatitis, no family history of MEN syndrome or medullary thyroid cancer. Possible side effects including nausea, bloating, other GI upset and rarely pancreatitis were discussed. She was advised to call the office with any symptoms or concerns.   Discussed importance of increasing water intake and fiber in diet and taking stool softeners as needed to relieve constipation.  -Continue Jardiance 10 mg QD. Discussed the medication's MOA and that it may cause more frequent urination particularly upon starting the medication. Take one tablet in the morning with or without food. Encouraged to drink plenty of water as to not get dehydrated especially in warmer weather or with activity. Also discussed there may be an increased incidence of UTIs or yeast infections and to monitor for signs/symptoms. Will send in Diflucan to treat current yeast symptoms.  -Continue Levemir 60 units QHS.   -Continue Glipizide 5 mg BID.  -S/S hypoglycemia reviewed with Rule of 15's advised.  -Follow-up in 3 months.   
none

## 2024-04-19 PROBLEM — E11.9 TYPE 2 DIABETES MELLITUS WITHOUT COMPLICATIONS: Chronic | Status: ACTIVE | Noted: 2017-02-03

## 2024-04-19 PROBLEM — Z98.890 OTHER SPECIFIED POSTPROCEDURAL STATES: Chronic | Status: ACTIVE | Noted: 2020-10-15

## 2024-04-22 ENCOUNTER — TRANSCRIPTION ENCOUNTER (OUTPATIENT)
Age: 69
End: 2024-04-22

## 2024-04-23 ENCOUNTER — TRANSCRIPTION ENCOUNTER (OUTPATIENT)
Age: 69
End: 2024-04-23

## 2024-04-23 ENCOUNTER — OUTPATIENT (OUTPATIENT)
Dept: OUTPATIENT SERVICES | Facility: HOSPITAL | Age: 69
LOS: 1 days | End: 2024-04-23
Payer: MEDICARE

## 2024-04-23 VITALS
SYSTOLIC BLOOD PRESSURE: 111 MMHG | HEART RATE: 62 BPM | DIASTOLIC BLOOD PRESSURE: 62 MMHG | OXYGEN SATURATION: 98 % | RESPIRATION RATE: 15 BRPM

## 2024-04-23 VITALS
DIASTOLIC BLOOD PRESSURE: 62 MMHG | TEMPERATURE: 98 F | HEIGHT: 61 IN | WEIGHT: 205.03 LBS | SYSTOLIC BLOOD PRESSURE: 138 MMHG | OXYGEN SATURATION: 99 % | HEART RATE: 64 BPM | RESPIRATION RATE: 18 BRPM

## 2024-04-23 DIAGNOSIS — H33.021 RETINAL DETACHMENT WITH MULTIPLE BREAKS, RIGHT EYE: ICD-10-CM

## 2024-04-23 DIAGNOSIS — H43.11 VITREOUS HEMORRHAGE, RIGHT EYE: ICD-10-CM

## 2024-04-23 DIAGNOSIS — Z98.890 OTHER SPECIFIED POSTPROCEDURAL STATES: Chronic | ICD-10-CM

## 2024-04-23 DIAGNOSIS — Z41.9 ENCOUNTER FOR PROCEDURE FOR PURPOSES OTHER THAN REMEDYING HEALTH STATE, UNSPECIFIED: Chronic | ICD-10-CM

## 2024-04-23 LAB
GLUCOSE BLDC GLUCOMTR-MCNC: 143 MG/DL — HIGH (ref 70–99)
GLUCOSE BLDC GLUCOMTR-MCNC: 144 MG/DL — HIGH (ref 70–99)

## 2024-04-23 PROCEDURE — 67036 REMOVAL OF INNER EYE FLUID: CPT | Mod: AS,RT

## 2024-04-23 PROCEDURE — C1889: CPT

## 2024-04-23 PROCEDURE — 67036 REMOVAL OF INNER EYE FLUID: CPT | Mod: RT

## 2024-04-23 PROCEDURE — 82962 GLUCOSE BLOOD TEST: CPT

## 2024-04-23 DEVICE — LASER PROBE 25G CONSTELLATION: Type: IMPLANTABLE DEVICE | Site: RIGHT | Status: FUNCTIONAL

## 2024-04-23 RX ORDER — DRONEDARONE 400 MG/1
1 TABLET, FILM COATED ORAL
Refills: 0 | DISCHARGE

## 2024-04-23 RX ORDER — WARFARIN SODIUM 2.5 MG/1
1 TABLET ORAL
Refills: 0 | DISCHARGE

## 2024-04-23 RX ORDER — TIRZEPATIDE 15 MG/.5ML
2.5 INJECTION, SOLUTION SUBCUTANEOUS
Refills: 0 | DISCHARGE

## 2024-04-23 RX ORDER — INSULIN GLARGINE 100 [IU]/ML
26 INJECTION, SOLUTION SUBCUTANEOUS
Refills: 0 | DISCHARGE

## 2024-04-23 RX ORDER — OMEPRAZOLE 10 MG/1
1 CAPSULE, DELAYED RELEASE ORAL
Refills: 0 | DISCHARGE

## 2024-04-23 RX ORDER — RAMIPRIL 5 MG
1 CAPSULE ORAL
Refills: 0 | DISCHARGE

## 2024-04-23 RX ORDER — METOPROLOL TARTRATE 50 MG
1 TABLET ORAL
Refills: 0 | DISCHARGE

## 2024-04-23 NOTE — ASU DISCHARGE PLAN (ADULT/PEDIATRIC) - CARE PROVIDER_API CALL
Fabio reyes  [address] [place], [place] [address]  Phone: (   )    -  Fax: (   )    -  Scheduled Appointment: 04/24/2024 09:30 AM

## 2024-04-23 NOTE — ASU PATIENT PROFILE, ADULT - NS PRO AD PATIENT TYPE
Returned call and spoke to mother who states child is pulling on her ears but per mother is teething and just started swim classes.  Mother denies any fevers.  Mother set up an appt.  To see Dr. Maldonado tomorrow at 4:00pm.  Mother agreed with this plan of care and had no further questions at this time.    Health Care Proxy (HCP)/Living Will

## 2024-04-23 NOTE — ASU PATIENT PROFILE, ADULT - NSICDXPASTSURGICALHX_GEN_ALL_CORE_FT
PAST SURGICAL HISTORY:  Elective surgery Left detached retina 2017    History of umbilical hernia repair 2006    S/P ablation of atrial fibrillation 2/2015 and 9/2015    S/P AV mendy ablation x2    S/P coronary angiogram

## 2024-04-23 NOTE — ASU PREOP CHECKLIST - ADVANCE DIRECTIVE ADDRESSED/READDRESSED
12/6/2020    PCP: Natalia Ruff MD    Chief Complaint   Patient presents with   • Office Visit   • Shoulder Pain     right shoulder pain    Patient offered telephone and video visit, preferred in clinic visit, risks discussed.    HPI:      Right shoulder pain   having pain in shoulder x 2 weeks radiates down arm, denies yvonne falls, believes bumped it, no neck stiffness, taking tylenol and uses rubbing alcohol on shoulder, helps, no CP, SOB, or headaches      Social hx  Granddaughter lives with pt      Review of Systems  Constitutional symptoms - No fever, no loss of appetite.   Cardiovascular - Negative for chest pain, palpitations, swelling of legs.   Respiratory - No shortness of breath, cough, wheezing.   Gastrointestinal - No nausea or vomiting or abdominal pain or melena or hematochezia or change in bowel habits.   All systems were reviewed and are negative except as in HPI.    Lab Results  Lab Results   Component Value Date    SODIUM 140 09/15/2020    POTASSIUM 4.2 09/15/2020    CHLORIDE 105 09/15/2020    CO2 27 09/15/2020    BUN 18 09/15/2020    CREATININE 1.31 (H) 09/15/2020    CALCIUM 10.1 09/15/2020    ALBUMIN 4.4 09/15/2020    BILIRUBIN 0.7 09/15/2020    ALKPT 73 09/15/2020    GPT 12 09/15/2020    AST 21 09/15/2020    GLUCOSE 106 (H) 09/15/2020     Lab Results   Component Value Date    CHOLESTEROL 237 (H) 09/15/2020    TRIGLYCERIDE 221 (H) 09/15/2020    HDL 48 (L) 09/15/2020    CALCLDL 145 (H) 09/15/2020     Lab Results   Component Value Date    WBC 6.9 02/10/2020    RBC 4.57 02/10/2020    HGB 12.3 02/10/2020    HCT 40.9 02/10/2020    MCV 89.5 02/10/2020    MCH 26.9 02/10/2020    MCHC 30.1 (L) 02/10/2020     02/10/2020    TLYMPH 28 02/10/2020    PMON 6 02/10/2020    PEOS 2 02/10/2020    PBASO 1 02/10/2020    ANEUT 4.3 02/10/2020    ALYMS 1.9 02/10/2020    TORRI 0.4 02/10/2020    AEOS 0.2 02/10/2020    ABASO 0.1 02/10/2020    NEUT NOT APPLICABLE 11/19/2019    TDIF AUTOMATED DIFFERENTIAL 02/10/2020     IANC NOT APPLICABLE 11/19/2019    NRBCRE 0 02/10/2020    PIMGR 0 02/10/2020    AIMGR 0.0 02/10/2020     Lab Results   Component Value Date    HGBA1C 6.6 (H) 09/15/2020     Lab Results   Component Value Date    TSH 2.636 09/15/2020     Lab Results   Component Value Date    VITD25 44.3 02/07/2017     Lab Results   Component Value Date    URMIC 11.50 12/02/2019    MALBCR 82.7 (H) 12/02/2019       Current Medications:   Current Medications    AMLODIPINE (NORVASC) 5 MG TABLET    Take 1 tablet by mouth daily.    ATENOLOL (TENORMIN) 25 MG TABLET    Take 1 tablet by mouth daily.    BLOOD GLUCOSE MONITORING SUPPL (ACCU-CHEK ALONDRA PLUS) W/DEVICE KIT    USE AS DIRECTED    BLOOD GLUCOSE TEST STRIP    1 strip daily. Needs accu check smartview testing strips    FLUTICASONE (FLONASE) 50 MCG/ACT NASAL SPRAY    Spray 1 spray in each nostril daily.    GLIMEPIRIDE (AMARYL) 1 MG TABLET    Take 1 tablet by mouth daily.    LANCETS (VITALET 26 GAUGE LANCET) MISC    daily.    LEVOTHYROXINE 25 MCG TABLET    Take 1 tablet by mouth daily.    OMEGA-3 FATTY ACIDS (FISH OIL) 1000 MG CAPSULE DELAYED RELEASE    Take 1 capsule by mouth 2 times daily.    POLYETHYLENE GLYCOL (MIRALAX) 17 G PACKET    Take 17 g by mouth daily. Stir and dissolve powder in any 4 to 8 ounces of beverage, then drink.       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 (CMS/[facility])    • Mitral regurgitation    • PAD (peripheral artery disease) (CMS/[facility])    • Type II diabetes mellitus with renal manifestations (CMS/[facility])        Social History     Socioeconomic History   • Marital status:      Spouse name: Not on file   • Number of children: Not on file   • Years of education: Not on file   • Highest education level: Not on file   Occupational  History   • Not on file   Social Needs   • Financial resource strain: Not on file   • Food insecurity     Worry: Not on file     Inability: Not on file   • Transportation needs     Medical: Not on file     Non-medical: Not on file   Tobacco Use   • Smoking status: Former Smoker   • Smokeless tobacco: Never Used   Substance and Sexual Activity   • Alcohol use: No     Frequency: Never   • Drug use: No   • Sexual activity: Not on file   Lifestyle   • Physical activity     Days per week: Not on file     Minutes per session: Not on file   • Stress: Not on file   Relationships   • Social connections     Talks on phone: Not on file     Gets together: Not on file     Attends Confucianism service: Not on file     Active member of club or organization: Not on file     Attends meetings of clubs or organizations: Not on file     Relationship status: Not on file   • Intimate partner violence     Fear of current or ex partner: Not on file     Emotionally abused: Not on file     Physically abused: Not on file     Forced sexual activity: Not on file   Other Topics Concern   • Not on file   Social History Narrative   • Not on file       Past Surgical History:   Procedure Laterality Date   • No past surgeries         ALLERGIES:   Allergen Reactions   • Aspirin Other (See Comments)     Unknown   • Penicillins Other (See Comments)     Unknown   • Strawberry   (Food Or Med) HIVES        Family History   Problem Relation Age of Onset   • Coronary Artery Disease Other    • Hypertension Other    • Diabetes Other        Examination:   Blood pressure (!) 148/85, pulse 85, temperature 96.8 °F (36 °C), resp. rate 16, height 5' 2\" (1.575 m), weight 52 kg (114 lb 10.2 oz), SpO2 98 %.  Weight    11/18/20 1447   Weight: 52 kg (114 lb 10.2 oz)       Physical Exam  GENERAL APPEARANCE: Well developed, well nourished, alert and cooperative, and appears to be in no acute distress.  HEAD: normocephalic  EYES: PERRL, EOMI.  vision is grossly intact, no  icterus, pallor or cyanosis   EARS: External auditory canals and tympanic membranes clear, no local tenderness, hearing grossly intact.   NOSE:  No nasal discharge.   THROAT:  Oral cavity and pharynx normal. No inflammation, swelling, exudate, or lesions.    CARDIAC: Normal S1 and S2. No S3, S4 or murmurs. pulse is regular and good volume.   LUNGS: Clear to auscultation without rales, rhonchi, wheezing or diminished breath sounds.  No accessory muscle usage, no local tenderness  GI/abdomen: soft, non tender, no hepatosplenomegaly, bowel sounds normal,  MUSKULOSKELETAL: no joint swelling or redness. rt shoulder ROM mild  restriction and tenderness no increased temperature   left shoulder is normal to exam  NECK: Neck supple, non-tender without cervical or supraclavicular lymphadenopathy, or masses  EXTREMITIES: No edema  NEUROLOGICAL  Normal gait.  Strength, sensory and reflexes symmetric and intact throughout bilaterally upper and lower limbs   SKIN: Skin normal color and temperature, no abnormal moles or lesions.  PSYCHIATRIC: oriented to person, place, and time.     Assessment   Problem List Items Addressed This Visit     None      Visit Diagnoses     Chronic right shoulder pain    -  Primary    Relevant Medications    acetaminophen (Tylenol 8 Hour Arthritis Pain) 650 MG CR tablet    Other Relevant Orders    XR SHOULDER 3 VIEWS RIGHT    SERVICE TO PHYSICAL THERAPY    SERVICE TO ORTHOPEDICS          Plan    Chronic Right shoulder pain  Prescribed tylenol 650 mg po prn  No side effects  XR right shoulder ordered  Referral to orthopedics consult given  Referral to physical therapy consult given     See pcp in 3-4 days    Side effects of above medications discussed,   Any referrals issued, advised patient to call his/her insurance to get the list of providers who are in network and covered  all questions answered,   patient agrees with plan,   Advised to call back to get test results.  Risks of delay discussed  Will  go to ER if any urgent symptoms    Scribe Attestation: On 11/18/2020, IKeenan scribed the services personally performed by Tomás Torres MD.   Provider Attestation: The documentation recorded by the scribe accurately reflects the service I personally performed and the decisions made by me, Tomás Torres MD.      done

## 2024-04-23 NOTE — ASU DISCHARGE PLAN (ADULT/PEDIATRIC) - PROVIDER TOKENS
FREE:[LAST:[amy],FIRST:[Fabio],PHONE:[(   )    -],FAX:[(   )    -],ADDRESS:[[address] [place] [address]],SCHEDULEDAPPT:[04/24/2024],SCHEDULEDAPPTTIME:[09:30 AM]]

## 2024-04-23 NOTE — ASU PATIENT PROFILE, ADULT - FALL HARM RISK - HARM RISK INTERVENTIONS

## 2024-04-23 NOTE — ASU PATIENT PROFILE, ADULT - NSICDXPASTMEDICALHX_GEN_ALL_CORE_FT
PAST MEDICAL HISTORY:  2019 novel coronavirus disease (COVID-19)     Afib on coumadin    CAD (coronary artery disease)     Diabetes dx 2020    Diverticulosis     Fibroid, uterine     Fibroids     Gall stones     H/O arthritis     H/O blood clots spleen , kidney    H/O hiatal hernia     H/O hydronephrosis     H/O mitral valve disorder     H/O vitamin D deficiency     History of diverticulitis     HTN (hypertension)     Kidney stones     Menorrhagia     Pneumonia     PONV (postoperative nausea and vomiting)     Post-menopause bleeding     Proteinuria     Renal infarct     S/P ablation of atrial fibrillation     Uterine fibroid     UTI (urinary tract infection)

## (undated) DEVICE — CANNULA MEDONE FLEXTIP 25G

## (undated) DEVICE — GLV 7 PROTEXIS (WHITE)

## (undated) DEVICE — DRAPE STERI-DRAPE INCISE 13X13"

## (undated) DEVICE — SYE-LASER - CONSTELLATION MACHINE #2 1003466901X: Type: DURABLE MEDICAL EQUIPMENT

## (undated) DEVICE — SUT VICRYL 7-0 12" TG140-8 DA

## (undated) DEVICE — ILM FORCEP 23G

## (undated) DEVICE — LENS VITRECTOMY FLAT

## (undated) DEVICE — SOL IRR BAL SALT + 500ML

## (undated) DEVICE — PACK VITRECTOMY

## (undated) DEVICE — PACK CONSTELLATION POST 25G 20K

## (undated) DEVICE — CONSTELLATION TOTAL PLUS PAK 23G

## (undated) DEVICE — NDL HYPO SAFE 22G X 1.5" (BLACK)

## (undated) DEVICE — CANNULA ALCON SOFT TIP 23G

## (undated) DEVICE — LIGHT SHIELD CORNEAL

## (undated) DEVICE — ELCTR BIPOLAR CORD J&J 12FT DISP

## (undated) DEVICE — ILM FORCEP 25G

## (undated) DEVICE — WARMING BLANKET LOWER ADULT

## (undated) DEVICE — CANNULA ALCON SOFT TIP 25G

## (undated) DEVICE — VENODYNE/SCD SLEEVE CALF MEDIUM